# Patient Record
Sex: MALE | Race: ASIAN
[De-identification: names, ages, dates, MRNs, and addresses within clinical notes are randomized per-mention and may not be internally consistent; named-entity substitution may affect disease eponyms.]

---

## 2019-12-17 ENCOUNTER — APPOINTMENT (OUTPATIENT)
Dept: ORTHOPEDIC SURGERY | Facility: CLINIC | Age: 49
End: 2019-12-17
Payer: SELF-PAY

## 2019-12-17 DIAGNOSIS — S93.409A SPRAIN OF UNSPECIFIED LIGAMENT OF UNSPECIFIED ANKLE, INITIAL ENCOUNTER: ICD-10-CM

## 2019-12-17 PROCEDURE — 99203 OFFICE O/P NEW LOW 30 MIN: CPT

## 2019-12-17 PROCEDURE — 73610 X-RAY EXAM OF ANKLE: CPT | Mod: LT

## 2019-12-18 PROBLEM — S93.409A ANKLE SPRAIN: Status: ACTIVE | Noted: 2019-12-18

## 2019-12-18 PROBLEM — Z00.00 ENCOUNTER FOR PREVENTIVE HEALTH EXAMINATION: Status: ACTIVE | Noted: 2019-12-18

## 2019-12-18 NOTE — HISTORY OF PRESENT ILLNESS
[de-identified] : Patient reports a twisting injury to her left ankle on November 21.  States swelling at the time and overall pain is improving but still has some discomfort and swelling in the ankle.  He is able and really with no significant discomfort

## 2019-12-18 NOTE — PHYSICAL EXAM
[de-identified] : Left ankle:\par \par Constitutional: \par The patient is healthy-appearing and in no apparent distress. \par \par Gait and Station: \par The patient ambulates with a normal gait and no limp. \par \par Cardiovascular System: \par Ther capillary refill is less than 2 seconds. \par \par Skin: \par There are no skin abnormalities of ankle.\par \par Ankles and Feet: \par Inspection: \par There is no erythema, induration, warmth, or deformity.  \par There is swelling (anterolateral). \par \par Bony Palpation: \par There is no tenderness of the calcaneal tuberosity, the metatarsals, the tarsometatarsal joints, the navicular tuberosity, the dome of talus, the head of talus, or the inferior tibiofibular joint.\par \par Soft Tissue Palpation: \par There is no tenderness of the tibialis posterior, the tibialis anterior, the plantar fascia, the Achilles tendon, the extensor hallucis longus, or the sinus tarsi. \par There is no tenderness of the peroneus longus and brevis.\par There is no tenderness of the deltoid ligament.   \par There is tenderness of the anterior talofibular ligament and the calcaneofibular ligament. \par \par Active Range of Motion: \par The range of motion at the ankle is full. \par \par Stability: \par The anterior drawer is negative. \par \par Strength: \par There is 5/5 ankle plantarflexion and dorsiflexion.\par \par Neurological System: \par There is normal sensation to light touch at the ankle and foot. \par \par Psychiatric: \par The patient demonstrates a normal mood and affect and is active and alert. [de-identified] : X-ray left ankle.  There is no significant bony abnormality arthritis or fracture

## 2019-12-18 NOTE — ASSESSMENT
[FreeTextEntry1] : Discussed at length with patient exam history and imaging.  Recommendation for home exercise and activity modification for the next 3-4 weeks. Persistent discomfort or concerns patient followup\par \par